# Patient Record
Sex: FEMALE | Race: WHITE | NOT HISPANIC OR LATINO | Employment: STUDENT | URBAN - METROPOLITAN AREA
[De-identification: names, ages, dates, MRNs, and addresses within clinical notes are randomized per-mention and may not be internally consistent; named-entity substitution may affect disease eponyms.]

---

## 2017-08-08 ENCOUNTER — OFFICE VISIT (OUTPATIENT)
Dept: FAMILY MEDICINE | Facility: CLINIC | Age: 22
End: 2017-08-08
Payer: COMMERCIAL

## 2017-08-08 VITALS
OXYGEN SATURATION: 97 % | HEIGHT: 66 IN | BODY MASS INDEX: 29.94 KG/M2 | DIASTOLIC BLOOD PRESSURE: 88 MMHG | SYSTOLIC BLOOD PRESSURE: 116 MMHG | WEIGHT: 186.31 LBS | HEART RATE: 73 BPM

## 2017-08-08 DIAGNOSIS — R21 RASH: Primary | ICD-10-CM

## 2017-08-08 PROCEDURE — 99203 OFFICE O/P NEW LOW 30 MIN: CPT | Mod: S$GLB,,, | Performed by: NURSE PRACTITIONER

## 2017-08-08 PROCEDURE — 99999 PR PBB SHADOW E&M-EST. PATIENT-LVL III: CPT | Mod: PBBFAC,,, | Performed by: NURSE PRACTITIONER

## 2017-08-08 PROCEDURE — 3008F BODY MASS INDEX DOCD: CPT | Mod: S$GLB,,, | Performed by: NURSE PRACTITIONER

## 2017-08-08 RX ORDER — SERTRALINE HYDROCHLORIDE 25 MG/1
TABLET, FILM COATED ORAL
COMMUNITY
Start: 2017-06-10 | End: 2017-08-08

## 2017-08-08 RX ORDER — CLOTRIMAZOLE AND BETAMETHASONE DIPROPIONATE 10; .64 MG/G; MG/G
CREAM TOPICAL 2 TIMES DAILY
Qty: 45 G | Refills: 0 | Status: SHIPPED | OUTPATIENT
Start: 2017-08-08

## 2017-08-08 NOTE — PROGRESS NOTES
Subjective:       Patient ID: Grace Melissa is a 22 y.o. female.    Chief Complaint: Rash (underarms)    Patient noticed a rash under both arms on 7/26/17. Gradually improving. Not itching. No new deodorant or soap. She did switch laundry detergent. She has not put anything on it. No new animals in the home.   Lipid Panel due on 1994  HPV Vaccines(1 of 3 - Female 3 Dose Series) due on 07/16/2005  TETANUS VACCINE due on 07/16/2012  Pap Smear due on 07/16/2015  Influenza Vaccine due on 08/01/2017    Past Medical History:  Past Medical History:  No date: ADHD (attention deficit hyperactivity disorder)  Past Surgical History:  No date: WISDOM TOOTH EXTRACTION  Review of patient's allergies indicates:  No Known Allergies  Current Outpatient Prescriptions on File Prior to Visit:  (DISCONTINUED) guanfacine 3 mg Tb24, Take 1 tablet by mouth once daily., Disp: , Rfl:   (DISCONTINUED) VYVANSE 50 mg capsule, Take 1 tablet by mouth once daily., Disp: , Rfl: 0    No current facility-administered medications on file prior to visit.     Social History    Marital status: Single              Spouse name:                       Years of education:                 Number of children:               Occupational History    None on file    Social History Main Topics    Smoking status: Never Smoker                                                                   Smokeless tobacco: Not on file                       Alcohol use: No              Drug use: Not on file     Sexual activity: Not on file          Other Topics            Concern    None on file    Social History Narrative    None on file      Review of patient's family history indicates:    Cancer                         Maternal Grandmother        Comment: Breast    Amblyopia                      Neg Hx                    Blindness                      Neg Hx                    Cataracts                      Neg Hx                    Diabetes                       Neg  Hx                    Glaucoma                       Neg Hx                    Hypertension                   Neg Hx                    Macular degeneration           Neg Hx                    Retinal detachment             Neg Hx                    Strabismus                     Neg Hx                    Stroke                         Neg Hx                    Thyroid disease                Neg Hx                            Review of Systems   Constitutional: Negative for chills and fever.   Respiratory: Negative.    Cardiovascular: Negative.    Gastrointestinal: Negative.    Genitourinary: Negative.    Neurological: Negative.        Objective:      Physical Exam   Constitutional: She is oriented to person, place, and time.   HENT:   Head: Normocephalic and atraumatic.   Cardiovascular: Normal rate.    Pulmonary/Chest: Effort normal.   Neurological: She is alert and oriented to person, place, and time.   Skin: Skin is warm and dry.        Vitals reviewed.      Assessment:       1. Rash        Plan:       1. Rash  Follow up if not resolving.   - clotrimazole-betamethasone 1-0.05% (LOTRISONE) cream; Apply topically 2 (two) times daily.  Dispense: 45 g; Refill: 0

## 2017-11-28 ENCOUNTER — TELEPHONE (OUTPATIENT)
Dept: PEDIATRICS | Facility: CLINIC | Age: 22
End: 2017-11-28

## 2017-11-28 NOTE — TELEPHONE ENCOUNTER
----- Message from Nona Mckeon sent at 11/28/2017  7:13 AM CST -----  Please call patient in regards to wether or not she got the whole series of HPV vax's, [phone]

## 2017-11-28 NOTE — TELEPHONE ENCOUNTER
Notified pt that she did complete the HPV series; pt states her OB/GYN just notified her that her recent pap show HPV so she wanted to make sure she had completed the series; pt verbalizes understanding.